# Patient Record
Sex: MALE | Race: BLACK OR AFRICAN AMERICAN | NOT HISPANIC OR LATINO | Employment: FULL TIME | URBAN - METROPOLITAN AREA
[De-identification: names, ages, dates, MRNs, and addresses within clinical notes are randomized per-mention and may not be internally consistent; named-entity substitution may affect disease eponyms.]

---

## 2018-08-18 ENCOUNTER — HOSPITAL ENCOUNTER (EMERGENCY)
Facility: OTHER | Age: 33
Discharge: HOME | End: 2018-08-19
Attending: EMERGENCY MEDICINE
Payer: MEDICAID

## 2018-08-18 VITALS
WEIGHT: 130 LBS | OXYGEN SATURATION: 99 % | DIASTOLIC BLOOD PRESSURE: 69 MMHG | BODY MASS INDEX: 19.77 KG/M2 | RESPIRATION RATE: 16 BRPM | TEMPERATURE: 98 F | HEART RATE: 72 BPM | SYSTOLIC BLOOD PRESSURE: 129 MMHG

## 2018-08-18 DIAGNOSIS — M54.2 NECK PAIN: Primary | ICD-10-CM

## 2018-08-18 DIAGNOSIS — M54.50 ACUTE BILATERAL LOW BACK PAIN WITHOUT SCIATICA: ICD-10-CM

## 2018-08-18 DIAGNOSIS — S09.90XA INJURY OF HEAD, INITIAL ENCOUNTER: ICD-10-CM
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
## 2018-08-18 PROCEDURE — 99283 EMERGENCY DEPT VISIT LOW MDM: CPT

## 2018-08-18 RX ORDER — CYCLOBENZAPRINE HCL 10 MG
10 TABLET ORAL 3 TIMES DAILY PRN
Qty: 15 TABLET | Refills: 0 | Status: SHIPPED | OUTPATIENT
Start: 2018-08-18 | End: 2018-08-23

## 2018-08-18 RX ORDER — OXAPROZIN 600 MG/1
600 TABLET, FILM COATED ORAL 2 TIMES DAILY PRN
Qty: 20 TABLET | Refills: 0 | Status: SHIPPED | OUTPATIENT
Start: 2018-08-18 | End: 2019-01-11
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
## 2019-01-11 ENCOUNTER — HOSPITAL ENCOUNTER (EMERGENCY)
Facility: OTHER | Age: 34
Discharge: HOME | End: 2019-01-12
Attending: EMERGENCY MEDICINE
Payer: MEDICAID

## 2019-01-11 VITALS
OXYGEN SATURATION: 98 % | RESPIRATION RATE: 18 BRPM | WEIGHT: 130 LBS | DIASTOLIC BLOOD PRESSURE: 66 MMHG | HEART RATE: 60 BPM | BODY MASS INDEX: 19.77 KG/M2 | SYSTOLIC BLOOD PRESSURE: 126 MMHG | TEMPERATURE: 98 F

## 2019-01-11 DIAGNOSIS — M54.9 BACK PAIN: ICD-10-CM
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
## 2019-01-11 PROCEDURE — 99284 EMERGENCY DEPT VISIT MOD MDM: CPT

## 2019-01-11 PROCEDURE — 25000003 PHARM REV CODE 250: Performed by: EMERGENCY MEDICINE

## 2019-01-11 RX ORDER — IBUPROFEN 600 MG/1
600 TABLET ORAL EVERY 6 HOURS PRN
Qty: 30 TABLET | Refills: 0 | OUTPATIENT
Start: 2019-01-11 | End: 2019-05-06

## 2019-01-11 RX ORDER — IBUPROFEN 600 MG/1
600 TABLET ORAL
Status: COMPLETED | OUTPATIENT
Start: 2019-01-11 | End: 2019-01-11

## 2019-01-11 RX ORDER — METHOCARBAMOL 500 MG/1
1500 TABLET, FILM COATED ORAL
Status: COMPLETED | OUTPATIENT
Start: 2019-01-11 | End: 2019-01-11

## 2019-01-11 RX ORDER — METHOCARBAMOL 750 MG/1
1500 TABLET, FILM COATED ORAL 2 TIMES DAILY PRN
Qty: 30 TABLET | Refills: 0 | Status: SHIPPED | OUTPATIENT
Start: 2019-01-11 | End: 2019-01-16

## 2019-01-11 RX ADMIN — IBUPROFEN 600 MG: 600 TABLET ORAL at 08:01

## 2019-01-11 RX ADMIN — METHOCARBAMOL TABLETS 1500 MG: 500 TABLET, COATED ORAL at 08:01
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
## 2019-01-12 NOTE — ED NOTES
Pt lying in bed, respirations even, unlabored, eyes open spontaneously, NAD noted, call bell within reach. Pt updated on plan of care, will continue to monitor with BP cycling and pulse ox

## 2019-05-06 ENCOUNTER — HOSPITAL ENCOUNTER (EMERGENCY)
Facility: OTHER | Age: 34
Discharge: HOME | End: 2019-05-06
Attending: EMERGENCY MEDICINE
Payer: MEDICAID

## 2019-05-06 VITALS
HEART RATE: 82 BPM | OXYGEN SATURATION: 97 % | RESPIRATION RATE: 18 BRPM | SYSTOLIC BLOOD PRESSURE: 121 MMHG | DIASTOLIC BLOOD PRESSURE: 72 MMHG | TEMPERATURE: 99 F | WEIGHT: 140 LBS | BODY MASS INDEX: 21.29 KG/M2

## 2019-05-06 DIAGNOSIS — K04.7 DENTAL INFECTION: Primary | ICD-10-CM

## 2019-05-06 PROCEDURE — 25000003 PHARM REV CODE 250: Performed by: PHYSICIAN ASSISTANT

## 2019-05-06 PROCEDURE — 99284 EMERGENCY DEPT VISIT MOD MDM: CPT

## 2019-05-06 RX ORDER — HYDROCODONE BITARTRATE AND ACETAMINOPHEN 5; 325 MG/1; MG/1
1 TABLET ORAL EVERY 6 HOURS PRN
Qty: 10 TABLET | Refills: 0 | Status: SHIPPED | OUTPATIENT
Start: 2019-05-06 | End: 2019-05-16

## 2019-05-06 RX ORDER — HYDROCODONE BITARTRATE AND ACETAMINOPHEN 5; 325 MG/1; MG/1
1 TABLET ORAL
Status: COMPLETED | OUTPATIENT
Start: 2019-05-06 | End: 2019-05-06

## 2019-05-06 RX ORDER — PENICILLIN V POTASSIUM 500 MG/1
500 TABLET, FILM COATED ORAL 2 TIMES DAILY
Qty: 20 TABLET | Refills: 0 | Status: SHIPPED | OUTPATIENT
Start: 2019-05-06 | End: 2019-05-16

## 2019-05-06 RX ORDER — PENICILLIN V POTASSIUM 500 MG/1
500 TABLET, FILM COATED ORAL
Status: COMPLETED | OUTPATIENT
Start: 2019-05-06 | End: 2019-05-06

## 2019-05-06 RX ORDER — IBUPROFEN 600 MG/1
600 TABLET ORAL EVERY 6 HOURS PRN
Qty: 20 TABLET | Refills: 0 | Status: SHIPPED | OUTPATIENT
Start: 2019-05-06

## 2019-05-06 RX ADMIN — HYDROCODONE BITARTRATE AND ACETAMINOPHEN 1 TABLET: 5; 325 TABLET ORAL at 11:05

## 2019-05-06 RX ADMIN — PENICILLIN V POTASIUM 500 MG: 500 TABLET OROPHARYNGEAL at 11:05

## 2019-05-06 NOTE — ED TRIAGE NOTES
Pt reports right sided lower dental pain since last night. Denies any fever. Denies taking any medications for pain.

## 2019-05-06 NOTE — ED PROVIDER NOTES
Encounter Date: 5/6/2019       History     Chief Complaint   Patient presents with    Dental Pain     Pt reports right lower dental pain since last night. Pt denies taking any otc meds for the pain     Patient is a 33-year-old male with no significant medical history who presents to the emergency department with dental pain. Patient states yesterday he noticed swelling in his right upper gum.  He states the swelling has worsened.  He reports a throbbing 10/10 pain.  He denies any drainage.  He denies any fevers or chills.  He states he does not see a dentist regularly.  He states while here, he would like to have test to make sure his body is okay and that he does not have cancer.    The history is provided by the patient.     Review of patient's allergies indicates:  No Known Allergies  No past medical history on file.  Past Surgical History:   Procedure Laterality Date    HERNIA REPAIR      inguinal    KNEE SURGERY Right      No family history on file.  Social History     Tobacco Use    Smoking status: Current Some Day Smoker     Packs/day: 1.00     Types: Cigarettes   Substance Use Topics    Alcohol use: No    Drug use: Yes     Types: Marijuana     Review of Systems   Constitutional: Negative for activity change, appetite change, chills, fatigue and fever.   HENT: Positive for dental problem. Negative for ear discharge, ear pain, postnasal drip, rhinorrhea and sore throat.    Respiratory: Negative for cough and shortness of breath.    Cardiovascular: Negative for chest pain.   Gastrointestinal: Negative for abdominal pain, blood in stool, constipation, diarrhea, nausea and vomiting.   Genitourinary: Negative for dysuria, flank pain and hematuria.   Musculoskeletal: Negative for back pain, neck pain and neck stiffness.   Skin: Negative for rash and wound.   Neurological: Negative for dizziness, weakness, light-headedness and headaches.       Physical Exam     Initial Vitals [05/06/19 1047]   BP Pulse Resp  Temp SpO2   121/72 82 18 99 °F (37.2 °C) 97 %      MAP       --         Physical Exam    Nursing note and vitals reviewed.  Constitutional: He appears well-developed and well-nourished. He is not diaphoretic.  Non-toxic appearance. No distress.   HENT:   Head: Normocephalic.   Right Ear: Hearing and external ear normal.   Left Ear: Hearing and external ear normal.   Nose: Nose normal.   Mouth/Throat: Uvula is midline, oropharynx is clear and moist and mucous membranes are normal. Abnormal dentition (Extensive dental decay). No oropharyngeal exudate.   Gold caps over multiple teeth    Right maxillary 1st bicuspid gingival erythema and edema. No obvious drainable abscess.     Eyes: Conjunctivae are normal. Pupils are equal, round, and reactive to light.   Neck: Normal range of motion.   Cardiovascular: Normal rate and regular rhythm.   Pulmonary/Chest: Breath sounds normal.   Abdominal: Soft. Bowel sounds are normal. There is no tenderness.   Lymphadenopathy:     He has no cervical adenopathy.   Neurological: He is alert and oriented to person, place, and time.   Skin: Skin is warm and dry. Capillary refill takes less than 2 seconds.   Psychiatric: He has a normal mood and affect.         ED Course   Procedures  Labs Reviewed - No data to display       Imaging Results    None          Medical Decision Making:   Initial Assessment:   Urgent evaluation of a 33-year-old male with no significant medical history who presents to the emergency department with dental pain. Patient is afebrile, nontoxic appearing, and hemodynamically stable. On exam, there is gingival erythema and edema at the right maxillary 1st bicuspid.  No obvious drainable abscess.  No evidence of Steve angina.  Patient will be treated with antibiotics.  Patient is advised to follow up with dentist for definitive treatment.  Patient is also given PCP contact information to establish care for full physical.  Patient is advised to return to the emergency  department with any worsening symptoms or concerns.                      Clinical Impression:       ICD-10-CM ICD-9-CM   1. Dental infection K04.7 522.4                                Destiny Wilson PA-C  05/06/19 120